# Patient Record
Sex: FEMALE | Race: OTHER | NOT HISPANIC OR LATINO
[De-identification: names, ages, dates, MRNs, and addresses within clinical notes are randomized per-mention and may not be internally consistent; named-entity substitution may affect disease eponyms.]

---

## 2019-12-01 ENCOUNTER — FORM ENCOUNTER (OUTPATIENT)
Age: 49
End: 2019-12-01

## 2019-12-02 ENCOUNTER — OUTPATIENT (OUTPATIENT)
Dept: OUTPATIENT SERVICES | Facility: HOSPITAL | Age: 49
LOS: 1 days | End: 2019-12-02
Payer: COMMERCIAL

## 2019-12-02 ENCOUNTER — APPOINTMENT (OUTPATIENT)
Dept: OTOLARYNGOLOGY | Facility: CLINIC | Age: 49
End: 2019-12-02
Payer: COMMERCIAL

## 2019-12-02 ENCOUNTER — TRANSCRIPTION ENCOUNTER (OUTPATIENT)
Age: 49
End: 2019-12-02

## 2019-12-02 VITALS
SYSTOLIC BLOOD PRESSURE: 149 MMHG | HEIGHT: 68 IN | HEART RATE: 73 BPM | OXYGEN SATURATION: 98 % | BODY MASS INDEX: 24.25 KG/M2 | WEIGHT: 160 LBS | DIASTOLIC BLOOD PRESSURE: 93 MMHG

## 2019-12-02 DIAGNOSIS — Z78.9 OTHER SPECIFIED HEALTH STATUS: ICD-10-CM

## 2019-12-02 DIAGNOSIS — Z81.8 FAMILY HISTORY OF OTHER MENTAL AND BEHAVIORAL DISORDERS: ICD-10-CM

## 2019-12-02 DIAGNOSIS — Z82.49 FAMILY HISTORY OF ISCHEMIC HEART DISEASE AND OTHER DISEASES OF THE CIRCULATORY SYSTEM: ICD-10-CM

## 2019-12-02 DIAGNOSIS — Z86.39 PERSONAL HISTORY OF OTHER ENDOCRINE, NUTRITIONAL AND METABOLIC DISEASE: ICD-10-CM

## 2019-12-02 DIAGNOSIS — Z86.2 PERSONAL HISTORY OF DISEASES OF THE BLOOD AND BLOOD-FORMING ORGANS AND CERTAIN DISORDERS INVOLVING THE IMMUNE MECHANISM: ICD-10-CM

## 2019-12-02 DIAGNOSIS — Z86.79 PERSONAL HISTORY OF OTHER DISEASES OF THE CIRCULATORY SYSTEM: ICD-10-CM

## 2019-12-02 DIAGNOSIS — E04.1 NONTOXIC SINGLE THYROID NODULE: ICD-10-CM

## 2019-12-02 PROBLEM — Z00.00 ENCOUNTER FOR PREVENTIVE HEALTH EXAMINATION: Status: ACTIVE | Noted: 2019-12-02

## 2019-12-02 PROCEDURE — 99204 OFFICE O/P NEW MOD 45 MIN: CPT | Mod: 25

## 2019-12-02 PROCEDURE — 76536 US EXAM OF HEAD AND NECK: CPT | Mod: 26

## 2019-12-02 PROCEDURE — 76536 US EXAM OF HEAD AND NECK: CPT

## 2019-12-02 PROCEDURE — 31575 DIAGNOSTIC LARYNGOSCOPY: CPT

## 2019-12-02 RX ORDER — NORETHINDRONE 0.35 MG/1
TABLET ORAL
Refills: 0 | Status: ACTIVE | COMMUNITY

## 2019-12-02 RX ORDER — ATORVASTATIN CALCIUM 80 MG/1
TABLET, FILM COATED ORAL
Refills: 0 | Status: ACTIVE | COMMUNITY

## 2019-12-02 NOTE — ASSESSMENT
[FreeTextEntry1] : 49F with right thyroid 3.4 cm B IV nodule with high risk for malignancy on ThyGenx. Management options discussed including right hemithyroidectomy vs total thyroidectomy. Pros and Cons for each were discussed. Patient will think about brent vs total thyroidectomy and will call with her decision.\par \par - PST papers given to patient\par - Patient to decide about Hemithyroidectomy vs total thyroidectomy - preliminary surgery date 12/191/19\par - US-Neck to r/o neck lymph nodes\par - TFT with antithyroglobulin

## 2019-12-02 NOTE — HISTORY OF PRESENT ILLNESS
[de-identified] : 49F with solitary right thyroid 3.4 cm nodule B IV on FNA (11/05/19). Patient also had thyGenX with high risk for malignancy 90% (11/05/19).\par \par Patient was found to have abnormal TFT that is why her PCP requested US-thyroid. The US showed right thyroid nodule 3.4 cm with coarse calcification. Patient reports maybe right neck on/off pain but denies hoarseness, lateral neck masses, dysphagia, SOB or cold/heat intolerance. She had 20 lbs intentional weight loss since July 2019. She used to be an Xray Technician. Her mother has low thyroid hormone but no thyroid cancer in her family.

## 2019-12-02 NOTE — PHYSICAL EXAM
[Midline] : trachea located in midline position [Laryngoscopy Performed] : laryngoscopy was performed, see procedure section for findings [Normal] : orientation to person, place, and time: normal [de-identified] : right prominet thryoid nodule but no discrete mass, mobile with deglutition

## 2019-12-05 LAB
T3 SERPL-MCNC: 102 NG/DL
T4 SERPL-MCNC: 7.2 UG/DL

## 2019-12-18 VITALS
WEIGHT: 157.19 LBS | TEMPERATURE: 98 F | HEIGHT: 68 IN | SYSTOLIC BLOOD PRESSURE: 127 MMHG | HEART RATE: 69 BPM | OXYGEN SATURATION: 100 % | RESPIRATION RATE: 16 BRPM | DIASTOLIC BLOOD PRESSURE: 72 MMHG

## 2019-12-18 RX ORDER — NORETHINDRONE 0.35 MG/1
0 TABLET ORAL
Qty: 0 | Refills: 0 | DISCHARGE

## 2019-12-18 NOTE — ASU PATIENT PROFILE, ADULT - PMH
Anemia  iron deficiency  Breast disease  fibrocystic  HTN (hypertension)    Hyperlipidemia    Lumbar disc disorder    Myopia    Thyroid nodule

## 2019-12-19 ENCOUNTER — RESULT REVIEW (OUTPATIENT)
Age: 49
End: 2019-12-19

## 2019-12-19 ENCOUNTER — OUTPATIENT (OUTPATIENT)
Dept: INPATIENT UNIT | Facility: HOSPITAL | Age: 49
LOS: 1 days | Discharge: HOME | End: 2019-12-19
Payer: COMMERCIAL

## 2019-12-19 ENCOUNTER — APPOINTMENT (OUTPATIENT)
Dept: OTOLARYNGOLOGY | Facility: HOSPITAL | Age: 49
End: 2019-12-19
Payer: COMMERCIAL

## 2019-12-19 DIAGNOSIS — Z98.890 OTHER SPECIFIED POSTPROCEDURAL STATES: Chronic | ICD-10-CM

## 2019-12-19 DIAGNOSIS — I83.90 ASYMPTOMATIC VARICOSE VEINS OF UNSPECIFIED LOWER EXTREMITY: Chronic | ICD-10-CM

## 2019-12-19 DIAGNOSIS — Z41.9 ENCOUNTER FOR PROCEDURE FOR PURPOSES OTHER THAN REMEDYING HEALTH STATE, UNSPECIFIED: Chronic | ICD-10-CM

## 2019-12-19 LAB
24R-OH-CALCIDIOL SERPL-MCNC: 37.7 NG/ML — SIGNIFICANT CHANGE UP (ref 30–80)
ALBUMIN SERPL ELPH-MCNC: 4.5 G/DL — SIGNIFICANT CHANGE UP (ref 3.3–5)
ALBUMIN SERPL ELPH-MCNC: 5.1 G/DL — HIGH (ref 3.3–5)
ALP SERPL-CCNC: 44 U/L — SIGNIFICANT CHANGE UP (ref 40–120)
ALP SERPL-CCNC: 45 U/L — SIGNIFICANT CHANGE UP (ref 40–120)
ALT FLD-CCNC: 13 U/L — SIGNIFICANT CHANGE UP (ref 10–45)
ALT FLD-CCNC: 13 U/L — SIGNIFICANT CHANGE UP (ref 10–45)
ANION GAP SERPL CALC-SCNC: 11 MMOL/L — SIGNIFICANT CHANGE UP (ref 5–17)
ANION GAP SERPL CALC-SCNC: 12 MMOL/L — SIGNIFICANT CHANGE UP (ref 5–17)
AST SERPL-CCNC: 17 U/L — SIGNIFICANT CHANGE UP (ref 10–40)
AST SERPL-CCNC: 19 U/L — SIGNIFICANT CHANGE UP (ref 10–40)
BILIRUB SERPL-MCNC: 0.5 MG/DL — SIGNIFICANT CHANGE UP (ref 0.2–1.2)
BILIRUB SERPL-MCNC: 0.5 MG/DL — SIGNIFICANT CHANGE UP (ref 0.2–1.2)
BUN SERPL-MCNC: 11 MG/DL — SIGNIFICANT CHANGE UP (ref 7–23)
BUN SERPL-MCNC: 11 MG/DL — SIGNIFICANT CHANGE UP (ref 7–23)
CA-I BLD-SCNC: 1.23 MMOL/L — SIGNIFICANT CHANGE UP (ref 1.12–1.3)
CALCIUM SERPL-MCNC: 8.8 MG/DL — SIGNIFICANT CHANGE UP (ref 8.4–10.5)
CALCIUM SERPL-MCNC: 9.8 MG/DL — SIGNIFICANT CHANGE UP (ref 8.4–10.5)
CHLORIDE SERPL-SCNC: 102 MMOL/L — SIGNIFICANT CHANGE UP (ref 96–108)
CHLORIDE SERPL-SCNC: 103 MMOL/L — SIGNIFICANT CHANGE UP (ref 96–108)
CO2 SERPL-SCNC: 24 MMOL/L — SIGNIFICANT CHANGE UP (ref 22–31)
CO2 SERPL-SCNC: 26 MMOL/L — SIGNIFICANT CHANGE UP (ref 22–31)
CREAT SERPL-MCNC: 0.69 MG/DL — SIGNIFICANT CHANGE UP (ref 0.5–1.3)
CREAT SERPL-MCNC: 0.77 MG/DL — SIGNIFICANT CHANGE UP (ref 0.5–1.3)
GLUCOSE SERPL-MCNC: 161 MG/DL — HIGH (ref 70–99)
GLUCOSE SERPL-MCNC: 93 MG/DL — SIGNIFICANT CHANGE UP (ref 70–99)
MAGNESIUM SERPL-MCNC: 1.9 MG/DL — SIGNIFICANT CHANGE UP (ref 1.6–2.6)
MAGNESIUM SERPL-MCNC: 2.2 MG/DL — SIGNIFICANT CHANGE UP (ref 1.6–2.6)
PHOSPHATE SERPL-MCNC: 3.1 MG/DL — SIGNIFICANT CHANGE UP (ref 2.5–4.5)
PHOSPHATE SERPL-MCNC: 3.3 MG/DL — SIGNIFICANT CHANGE UP (ref 2.5–4.5)
POTASSIUM SERPL-MCNC: 3.8 MMOL/L — SIGNIFICANT CHANGE UP (ref 3.5–5.3)
POTASSIUM SERPL-MCNC: 4.2 MMOL/L — SIGNIFICANT CHANGE UP (ref 3.5–5.3)
POTASSIUM SERPL-SCNC: 3.8 MMOL/L — SIGNIFICANT CHANGE UP (ref 3.5–5.3)
POTASSIUM SERPL-SCNC: 4.2 MMOL/L — SIGNIFICANT CHANGE UP (ref 3.5–5.3)
PROT SERPL-MCNC: 6.9 G/DL — SIGNIFICANT CHANGE UP (ref 6–8.3)
PROT SERPL-MCNC: 7.7 G/DL — SIGNIFICANT CHANGE UP (ref 6–8.3)
PTH-INTACT IO % DIF SERPL: 30.7 PG/ML — SIGNIFICANT CHANGE UP (ref 8.5–72.5)
PTH-INTACT IO % DIF SERPL: 31.7 PG/ML — SIGNIFICANT CHANGE UP (ref 8.5–72.5)
PTH-INTACT IO % DIF SERPL: 43.9 PG/ML — SIGNIFICANT CHANGE UP (ref 8.5–72.5)
SODIUM SERPL-SCNC: 138 MMOL/L — SIGNIFICANT CHANGE UP (ref 135–145)
SODIUM SERPL-SCNC: 140 MMOL/L — SIGNIFICANT CHANGE UP (ref 135–145)

## 2019-12-19 PROCEDURE — 95865 NEEDLE EMG LARYNX: CPT | Mod: 26

## 2019-12-19 PROCEDURE — 88307 TISSUE EXAM BY PATHOLOGIST: CPT | Mod: 26

## 2019-12-19 PROCEDURE — 60240 REMOVAL OF THYROID: CPT

## 2019-12-19 RX ORDER — LEVOTHYROXINE SODIUM 125 MCG
1 TABLET ORAL
Qty: 30 | Refills: 0
Start: 2019-12-19 | End: 2020-01-17

## 2019-12-19 RX ORDER — ACETAMINOPHEN 500 MG
650 TABLET ORAL EVERY 6 HOURS
Refills: 0 | Status: DISCONTINUED | OUTPATIENT
Start: 2019-12-19 | End: 2019-12-20

## 2019-12-19 RX ORDER — LEVOTHYROXINE SODIUM 125 MCG
100 TABLET ORAL DAILY
Refills: 0 | Status: DISCONTINUED | OUTPATIENT
Start: 2019-12-19 | End: 2019-12-20

## 2019-12-19 RX ORDER — BENZOCAINE AND MENTHOL 5; 1 G/100ML; G/100ML
1 LIQUID ORAL
Refills: 0 | Status: DISCONTINUED | OUTPATIENT
Start: 2019-12-19 | End: 2019-12-20

## 2019-12-19 RX ORDER — ONDANSETRON 8 MG/1
4 TABLET, FILM COATED ORAL EVERY 6 HOURS
Refills: 0 | Status: DISCONTINUED | OUTPATIENT
Start: 2019-12-19 | End: 2019-12-20

## 2019-12-19 RX ORDER — HYDROMORPHONE HYDROCHLORIDE 2 MG/ML
0.5 INJECTION INTRAMUSCULAR; INTRAVENOUS; SUBCUTANEOUS EVERY 4 HOURS
Refills: 0 | Status: DISCONTINUED | OUTPATIENT
Start: 2019-12-19 | End: 2019-12-20

## 2019-12-19 RX ORDER — OXYCODONE AND ACETAMINOPHEN 5; 325 MG/1; MG/1
1 TABLET ORAL ONCE
Refills: 0 | Status: DISCONTINUED | OUTPATIENT
Start: 2019-12-19 | End: 2019-12-19

## 2019-12-19 RX ORDER — SODIUM CHLORIDE 9 MG/ML
1000 INJECTION, SOLUTION INTRAVENOUS
Refills: 0 | Status: DISCONTINUED | OUTPATIENT
Start: 2019-12-19 | End: 2019-12-20

## 2019-12-19 RX ADMIN — OXYCODONE AND ACETAMINOPHEN 1 TABLET(S): 5; 325 TABLET ORAL at 23:32

## 2019-12-19 RX ADMIN — OXYCODONE AND ACETAMINOPHEN 1 TABLET(S): 5; 325 TABLET ORAL at 22:32

## 2019-12-19 NOTE — BRIEF OPERATIVE NOTE - OPERATION/FINDINGS
Firm 2-3cm thyroid nodule right midpole, bilateral recurrent laryngeal nerves identified, preserved and stimulated at the end of the case. Bilateral superior and inferior parathroid glands identified and preserved.

## 2019-12-19 NOTE — PROGRESS NOTE ADULT - SUBJECTIVE AND OBJECTIVE BOX
ENT POSTOP CHECK NOTE    HPI: 49yoF s/p total thyroidectomy for [place] IV right thyroid nodule and +molecular testing 12/19.    Interval: Seen and examined in PACU. AFVSS, CHARLEY. Denies chest pain, SOB, voice changes, paresthesias. Postop labs pending     Vital Signs Last 24 Hrs  T(C): 36.8 (19 Dec 2019 15:20), Max: 36.8 (19 Dec 2019 15:20)  T(F): 98.2 (19 Dec 2019 15:20), Max: 98.2 (19 Dec 2019 15:20)  HR: 78 (19 Dec 2019 16:35) (70 - 86)  BP: 126/66 (19 Dec 2019 16:35) (123/69 - 134/73)  BP(mean): 88 (19 Dec 2019 15:50) (88 - 88)  RR: 14 (19 Dec 2019 16:35) (13 - 21)  SpO2: 94% (19 Dec 2019 16:35) (94% - 97%)    PE:  NAD, A&Ox2  NC/AT, EOMI, face symmetric  voice strong, nonlabored respirations  Neck incision c/d/i, soft, flat, no e/o hematoma  Chvostek's neg  WELDON, wwp    A/P:  49yoF s/p total thyroidectomy for [place] IV right thyroid nodule and +molecular testing 12/19.    -prn pain  -prn nausea  -regular diet  -cepacol prn  -d/c IVF when tolerating PO  -postop labs 1730, AM Ca/PTH  -start synthroid in AM  -HOB@30  -SCDs, up ad ciarra  -home meds    Dispo: regional, 23hr obs    d/w attending who agrees with plan above

## 2019-12-20 ENCOUNTER — TRANSCRIPTION ENCOUNTER (OUTPATIENT)
Age: 49
End: 2019-12-20

## 2019-12-20 VITALS
TEMPERATURE: 98 F | DIASTOLIC BLOOD PRESSURE: 60 MMHG | RESPIRATION RATE: 17 BRPM | SYSTOLIC BLOOD PRESSURE: 100 MMHG | HEART RATE: 66 BPM | OXYGEN SATURATION: 98 %

## 2019-12-20 LAB
CA-I BLD-SCNC: 1.2 MMOL/L — SIGNIFICANT CHANGE UP (ref 1.12–1.3)
CALCIUM SERPL-MCNC: 8.3 MG/DL — LOW (ref 8.4–10.5)
PTH-INTACT IO % DIF SERPL: 34.3 PG/ML — SIGNIFICANT CHANGE UP (ref 8.5–72.5)

## 2019-12-20 PROCEDURE — C1889: CPT

## 2019-12-20 PROCEDURE — 88307 TISSUE EXAM BY PATHOLOGIST: CPT

## 2019-12-20 PROCEDURE — 82310 ASSAY OF CALCIUM: CPT

## 2019-12-20 PROCEDURE — 82330 ASSAY OF CALCIUM: CPT

## 2019-12-20 PROCEDURE — 84100 ASSAY OF PHOSPHORUS: CPT

## 2019-12-20 PROCEDURE — 36415 COLL VENOUS BLD VENIPUNCTURE: CPT

## 2019-12-20 PROCEDURE — 82306 VITAMIN D 25 HYDROXY: CPT

## 2019-12-20 PROCEDURE — 83735 ASSAY OF MAGNESIUM: CPT

## 2019-12-20 PROCEDURE — 83970 ASSAY OF PARATHORMONE: CPT

## 2019-12-20 PROCEDURE — 60240 REMOVAL OF THYROID: CPT

## 2019-12-20 PROCEDURE — 80053 COMPREHEN METABOLIC PANEL: CPT

## 2019-12-20 RX ORDER — CALCIUM CARBONATE 500(1250)
1 TABLET ORAL
Qty: 90 | Refills: 1
Start: 2019-12-20 | End: 2020-02-17

## 2019-12-20 RX ORDER — CALCITRIOL 0.5 UG/1
1 CAPSULE ORAL
Qty: 60 | Refills: 1
Start: 2019-12-20 | End: 2020-02-17

## 2019-12-20 RX ORDER — CALCIUM CARBONATE 500(1250)
1 TABLET ORAL
Qty: 60 | Refills: 1
Start: 2019-12-20 | End: 2020-02-17

## 2019-12-20 RX ADMIN — Medication 100 MICROGRAM(S): at 05:47

## 2019-12-20 NOTE — DISCHARGE NOTE PROVIDER - HOSPITAL COURSE
HPI: 49yoF s/p total thyroidectomy for [place] IV right thyroid nodule and +molecular testing 12/19.        Interval: Seen and examined in PACU. AFVSS, CHARLEY. Denies chest pain, SOB, voice changes, paresthesias. Postop labs pending     12/20: NAEON, pt remains AFVSS. Voice strong. Denies numbness, paresthesias. Tolerating diet. Pain controlled. Ambulating.         Vital Signs Last 24 Hrs    T(C): 36.7 (20 Dec 2019 08:50), Max: 36.9 (20 Dec 2019 05:00)    T(F): 98 (20 Dec 2019 08:50), Max: 98.4 (20 Dec 2019 05:00)    HR: 66 (20 Dec 2019 08:50) (66 - 88)    BP: 100/60 (20 Dec 2019 08:50) (100/60 - 138/86)    BP(mean): 98 (19 Dec 2019 17:35) (88 - 98)    RR: 17 (20 Dec 2019 08:50) (13 - 21)    SpO2: 98% (20 Dec 2019 08:50) (94% - 99%)        PE:    NAD, A&Ox2    NC/AT, EOMI, face symmetric    voice strong, nonlabored respirations    Neck incision c/d/i, soft, flat, no e/o hematoma    Chvostek's neg    ish WELDON        12-19        138  |  102  |  11    ----------------------------<  161<H>    4.2   |  24  |  0.69        Ca    8.3<L>      20 Dec 2019 07:23    Phos  3.1     12-19    Mg     1.9     12-19        TPro  6.9  /  Alb  4.5  /  TBili  0.5  /  DBili  x   /  AST  19  /  ALT  13  /  AlkPhos  44  12-19        A/P:  49yoF s/p total thyroidectomy for [place] IV right thyroid nodule and +molecular testing 12/19. Progressing well postoperatively.         -Clear for discharge home     -Calcium carbonate 1250 mg BID     -Calcitriol 0.25 mcg BID     -Synthroid 100 mcg qd     -Percocet PRN     -Follow up with Dr. Sullivan (call office for appointment)

## 2019-12-20 NOTE — DISCHARGE NOTE PROVIDER - NSDCMRMEDTOKEN_GEN_ALL_CORE_FT
calcium carbonate 1250 mg (500 mg elemental calcium) oral tablet: 1 tab(s) orally 2 times a day   levothyroxine 100 mcg (0.1 mg) oral tablet: 1 tab(s) orally once a day   oxycodone-acetaminophen 5 mg-325 mg oral tablet: 1 tab(s) orally every 6 hours, As Needed -for moderate pain MDD:4  Rocaltrol 0.25 mcg oral capsule: 1 cap(s) orally 2 times a day

## 2019-12-20 NOTE — DISCHARGE NOTE PROVIDER - CARE PROVIDER_API CALL
Rasheed Sullivan)  Otolaryngology  [address] [place] [address]  Phone: [phone]  Fax: [phone]  Follow Up Time:

## 2019-12-20 NOTE — DISCHARGE NOTE PROVIDER - NSDCCPCAREPLAN_GEN_ALL_CORE_FT
PRINCIPAL DISCHARGE DIAGNOSIS  Diagnosis: S/P total thyroidectomy  Assessment and Plan of Treatment:

## 2019-12-20 NOTE — DISCHARGE NOTE NURSING/CASE MANAGEMENT/SOCIAL WORK - PATIENT PORTAL LINK FT
You can access the FollowMyHealth Patient Portal offered by [facility] by registering at the following website: [email or web address]. By joining [employer]’s FollowMyHealth portal, you will also be able to view your health information using other applications (apps) compatible with our system.

## 2019-12-20 NOTE — PROGRESS NOTE ADULT - SUBJECTIVE AND OBJECTIVE BOX
HPI: 49yoF s/p total thyroidectomy for [place] IV right thyroid nodule and +molecular testing 12/19.    Interval: Seen and examined in PACU. AFVSS, CHARLEY. Denies chest pain, SOB, voice changes, paresthesias. Postop labs pending   12/20: NAEON, pt remains AFVSS. Voice strong. Denies numbness, paresthesias. Tolerating diet. Pain controlled. Ambulating.     Vital Signs Last 24 Hrs  T(C): 36.7 (20 Dec 2019 08:50), Max: 36.9 (20 Dec 2019 05:00)  T(F): 98 (20 Dec 2019 08:50), Max: 98.4 (20 Dec 2019 05:00)  HR: 66 (20 Dec 2019 08:50) (66 - 88)  BP: 100/60 (20 Dec 2019 08:50) (100/60 - 138/86)  BP(mean): 98 (19 Dec 2019 17:35) (88 - 98)  RR: 17 (20 Dec 2019 08:50) (13 - 21)  SpO2: 98% (20 Dec 2019 08:50) (94% - 99%)    PE:  NAD, A&Ox2  NC/AT, EOMI, face symmetric  voice strong, nonlabored respirations  Neck incision c/d/i, soft, flat, no e/o hematoma  Chvostek's neg  WELDON, beverlyp    A/P:  49yoF s/p total thyroidectomy for [place] IV right thyroid nodule and +molecular testing 12/19. Progressing well postoperatively.     -prn pain  -prn nausea  -regular diet  -cepacol prn  -d/c IVF   -Regular diet   -Ambulate as tolerated   -C/w synthroid   -home meds

## 2019-12-26 LAB — SURGICAL PATHOLOGY STUDY: SIGNIFICANT CHANGE UP

## 2020-01-08 ENCOUNTER — APPOINTMENT (OUTPATIENT)
Dept: OTOLARYNGOLOGY | Facility: CLINIC | Age: 50
End: 2020-01-08
Payer: COMMERCIAL

## 2020-01-08 VITALS
HEIGHT: 68 IN | DIASTOLIC BLOOD PRESSURE: 78 MMHG | OXYGEN SATURATION: 98 % | BODY MASS INDEX: 24.25 KG/M2 | HEART RATE: 64 BPM | SYSTOLIC BLOOD PRESSURE: 130 MMHG | WEIGHT: 160 LBS

## 2020-01-08 DIAGNOSIS — C73 MALIGNANT NEOPLASM OF THYROID GLAND: ICD-10-CM

## 2020-01-08 PROCEDURE — 99024 POSTOP FOLLOW-UP VISIT: CPT

## 2020-01-08 PROCEDURE — 31575 DIAGNOSTIC LARYNGOSCOPY: CPT | Mod: 58

## 2020-01-10 LAB
CALCIUM SERPL-MCNC: 10.1 MG/DL
CALCIUM SERPL-MCNC: 10.1 MG/DL
PARATHYROID HORMONE INTACT: 10 PG/ML

## 2020-01-12 PROBLEM — C73 PAPILLARY THYROID CARCINOMA: Status: ACTIVE | Noted: 2020-01-08

## 2020-01-13 NOTE — HISTORY OF PRESENT ILLNESS
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  Post-op PTH 5, on calcium and Vit D supplementation.  She has seen her endo Elias Ruiz.  Pt doing well, no complaints.

## 2020-01-13 NOTE — PROCEDURE
[Image(s) Captured] : image(s) captured and filed [Unable to Cooperate with Mirror] : patient unable to cooperate with mirror [None] : none [Flexible Endoscope] : examined with the flexible endoscope [de-identified] : Re-evaluate RLN function post-op [de-identified] : Flexible fiberoptic laryngoscope advanced orally, no oropharyngeal lesions or masses identified, larynx visualized, adequate and symmetric cord adduction and abduction noted.\par

## 2020-01-13 NOTE — ASSESSMENT
[FreeTextEntry1] : 49F s/p total thyroidectomy on 12/19/19 for PTC, encapsulated follicular variant with transcapsular invasion, 3.3 cm in right lobe.  \par \par Plan: \par - Advised pt there were some adverse features on pathology, but continues to be low risk.\par - Will consult with her endo to see if there is any role for post-op MARSH.\par - 10% risk of recurrence.  \par - Continue Vit E bid to scar.\par - Repeat Ca/PTH today.\par - F/u 3 months.\par - Pt verbalized understanding of above.

## 2020-01-13 NOTE — PHYSICAL EXAM
[Laryngoscopy Performed] : laryngoscopy was performed, see procedure section for findings [Normal] : orientation to person, place, and time: normal [de-identified] : healing neck incision, dry, clean, no edema, no erythema

## 2020-02-04 PROBLEM — D64.9 ANEMIA, UNSPECIFIED: Chronic | Status: ACTIVE | Noted: 2019-12-18

## 2020-02-04 PROBLEM — H52.10 MYOPIA, UNSPECIFIED EYE: Chronic | Status: ACTIVE | Noted: 2019-12-18

## 2020-02-04 PROBLEM — N64.9 DISORDER OF BREAST, UNSPECIFIED: Chronic | Status: ACTIVE | Noted: 2019-12-18

## 2020-02-04 PROBLEM — I10 ESSENTIAL (PRIMARY) HYPERTENSION: Chronic | Status: ACTIVE | Noted: 2019-12-18

## 2020-02-04 PROBLEM — E78.5 HYPERLIPIDEMIA, UNSPECIFIED: Chronic | Status: ACTIVE | Noted: 2019-12-18

## 2020-02-04 PROBLEM — E04.1 NONTOXIC SINGLE THYROID NODULE: Chronic | Status: ACTIVE | Noted: 2019-12-18

## 2020-02-04 PROBLEM — M51.9 UNSPECIFIED THORACIC, THORACOLUMBAR AND LUMBOSACRAL INTERVERTEBRAL DISC DISORDER: Chronic | Status: ACTIVE | Noted: 2019-12-18

## 2020-05-06 ENCOUNTER — APPOINTMENT (OUTPATIENT)
Dept: OTOLARYNGOLOGY | Facility: CLINIC | Age: 50
End: 2020-05-06

## 2020-07-08 LAB
THYROGLOB AB SERPL-ACNC: <20 IU/ML
THYROPEROXIDASE AB SERPL IA-ACNC: <10 IU/ML
TSH SERPL-ACNC: 3.29 UIU/ML